# Patient Record
Sex: MALE | Race: WHITE | NOT HISPANIC OR LATINO | Employment: STUDENT | URBAN - METROPOLITAN AREA
[De-identification: names, ages, dates, MRNs, and addresses within clinical notes are randomized per-mention and may not be internally consistent; named-entity substitution may affect disease eponyms.]

---
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
## 2024-01-02 ENCOUNTER — TELEPHONE (OUTPATIENT)
Dept: PULMONOLOGY | Facility: CLINIC | Age: 19
End: 2024-01-02
Payer: MEDICAID

## 2024-01-02 NOTE — TELEPHONE ENCOUNTER
----- Message from Torrie Hernandez sent at 1/2/2024 11:56 AM CST -----  Contact: Rachel  [phone]  Type: Needs Medical Advice  Who Called:  Pts Grandmother Paula     Best Call Back Number: [phone]    Additional Information: Paula calling to schedule Pt a NP appt. She stated he was referred by [facility] for a collapsed lung. Pls call back and advise

## 2024-01-02 NOTE — TELEPHONE ENCOUNTER
Placed a call to the pt in regards to a call back. Pt is trying to see a NP about a collapse lung issues. Pt is on Medicaid. The Medicaid hotline number was given to the pt. Pt verbalized understanding.

## 2025-02-13 ENCOUNTER — TELEPHONE (OUTPATIENT)
Dept: ORTHOPEDIC SURGERY | Facility: CLINIC | Age: 20
End: 2025-02-13
Payer: OTHER MISCELLANEOUS

## 2025-02-13 NOTE — TELEPHONE ENCOUNTER
Findings suspicious for first MTP joint sesamoid fracture.  XRAY 1/22 REFERRAL FROM [facility] TO SCHEDULE NEXT OPENING?

## (undated) DEVICE — STERI-DRAPE 22 X 26

## (undated) DEVICE — DRAPE SPLIT STERILE

## (undated) DEVICE — TRAY MUSCLE LID EYE

## (undated) DEVICE — CORD BIPOLAR 12 FOOT

## (undated) DEVICE — SUT 6/0 18IN COATED VICRYL

## (undated) DEVICE — DRESSING TRANS 2X2 TEGADERM

## (undated) DEVICE — SOL BETADINE 5%

## (undated) DEVICE — FORCEP CURVED DISP